# Patient Record
Sex: FEMALE | Race: BLACK OR AFRICAN AMERICAN | Employment: FULL TIME | URBAN - METROPOLITAN AREA
[De-identification: names, ages, dates, MRNs, and addresses within clinical notes are randomized per-mention and may not be internally consistent; named-entity substitution may affect disease eponyms.]

---

## 2017-04-14 ENCOUNTER — HOSPITAL ENCOUNTER (OUTPATIENT)
Dept: CT IMAGING | Age: 67
Discharge: HOME | End: 2017-04-14
Attending: INTERNAL MEDICINE
Payer: SELF-PAY

## 2017-04-14 DIAGNOSIS — R06.09 DOE (DYSPNEA ON EXERTION): ICD-10-CM

## 2017-04-14 PROCEDURE — 75571 CT HRT W/O DYE W/CA TEST: CPT

## 2017-04-19 ENCOUNTER — TELEPHONE (OUTPATIENT)
Dept: CARDIAC REHAB | Age: 67
End: 2017-04-19

## 2018-12-19 ENCOUNTER — OFFICE VISIT (OUTPATIENT)
Dept: CARDIOLOGY CLINIC | Age: 68
End: 2018-12-19

## 2018-12-19 VITALS
BODY MASS INDEX: 23.39 KG/M2 | HEART RATE: 68 BPM | DIASTOLIC BLOOD PRESSURE: 84 MMHG | HEIGHT: 64 IN | SYSTOLIC BLOOD PRESSURE: 146 MMHG | WEIGHT: 137 LBS | OXYGEN SATURATION: 98 %

## 2018-12-19 DIAGNOSIS — R07.9 CHEST PAIN, UNSPECIFIED TYPE: Primary | ICD-10-CM

## 2018-12-19 DIAGNOSIS — R42 DIZZINESS: ICD-10-CM

## 2018-12-19 DIAGNOSIS — R06.09 DOE (DYSPNEA ON EXERTION): ICD-10-CM

## 2018-12-19 RX ORDER — ASPIRIN 81 MG/1
TABLET ORAL DAILY
COMMUNITY

## 2018-12-19 RX ORDER — ATORVASTATIN CALCIUM 20 MG/1
TABLET, FILM COATED ORAL DAILY
COMMUNITY

## 2018-12-19 NOTE — PROGRESS NOTES
Chip Hines presents today for a follow up    Chip Hines preferred language for health care discussion is english/other. Is someone accompanying this pt? No    Is the patient using any DME equipment during OV? No    Depression Screening:  PHQ over the last two weeks 12/19/2018   Little interest or pleasure in doing things Not at all   Feeling down, depressed, irritable, or hopeless Not at all   Total Score PHQ 2 0       Learning Assessment:  Learning Assessment 3/9/2015   PRIMARY LEARNER Patient   PRIMARY LANGUAGE ENGLISH   LEARNER PREFERENCE PRIMARY LISTENING     DEMONSTRATION     READING   ANSWERED BY patient   RELATIONSHIP SELF         Fall Risk  Fall Risk Assessment, last 12 mths 12/19/2018   Able to walk? Yes   Fall in past 12 months? No       Pt currently taking Anticoagulant or Antiplatelet therapy? Yes    Coordination of Care:  1. Have you been to the ER, urgent care clinic since your last visit? Hospitalized since your last visit? No    2. Have you seen or consulted any other health care providers outside of the [address] [place] [address] since your last visit? Include any pap smears or colon screening. No    Has patient had recent lab work or cardiac testing?  Lab work 4 months ago    Patient verified medications verbally

## 2018-12-19 NOTE — PROGRESS NOTES
HISTORY OF PRESENT ILLNESS  Di Gutierrez is a 80 y.o. female. HPI  This is her first visit since August 2016. She is complaining of recurrent episodes of exertional chest burning and tightness in the mid substernal area which is associated with the shortness of breath. She claims that this occurs every time she walks fast or does heavy housecleaning. She denies resting chest pain, resting dyspnea, orthopnea, PND. She denies palpitation, dizziness or syncope. She denies any symptoms of TIA or amaurosis fugax. She indicates that her evaluation for hypercoagulable state has been negative and she has been off Xarelto for a few years with no further issues with DVT or pulmonary emboli. She underwent coronary calcium score on 4/19/17 which was calculated at 16 indicating low probability of coronary artery disease. She had right shoulder rotator cuff repair in early February 2015. She had nausea and vomiting related to pain medication for four days and became extremely dehydrated. She was admitted to the hospital between 2/8/15-2/16/15 with hypotensive shock. She ended up with a diagnosis of acute pulmonary embolism with right ventricular dilatation and right ventricular dysfunction from pulmonary hypertension. She had acute renal failure on admission with creatinine up to 4.0 which had become normalized at the time of discharge. She had a negative vascular study for DVT in the legs, as well as in the arm, however her CT scan demonstrated multiple pulmonary emboli. She has been treated with Xarelto ever since. She did have cardiac enzyme elevation with the troponin up to 1.12 and total CPK of 1,478 with MB of 9.3. Her echocardiogram on 2/9/15 demonstrated normal left ventricular systolic function with EF in the range of 55-60% and grade 1 diastolic dysfunction. There was severely dilated left ventricle with markedly reduced right ventricular function. There was no significant valvular pathology.  PA pressure was estimated in the range of at least 50-55 mmHg. She did have transient thrombocytopenia which has resolved. She also was found to have shock liver from the hemodynamic deterioration.    She is a nonsmoker and she has no history of diabetes mellitus. She is a non drinker. She has a history of hypertension and family history of coronary artery disease in that her father had a heart attack in his late 46s or early 62s. She indicates that her cholesterol level has been borderline high.    She had a follow up echocardiogram on 4/20/15 which demonstrated normal left ventricular systolic function with EF in the 60% range. Her right ventricle was mildly dilated, but the size has decreased significantly. PA pressure was in the range of 30-35 mmHg. There was no significant valvular pathology. In comparison from the study of 2/9/15, the overall left ventricular function has not changed. The right ventricular size has decreased and overall right ventricular function has improved. The pulmonary artery pressure has decreased to very mild elevation. She underwent stress nuclear cardiac imaging on 4/20/15, which demonstrated normal perfusion with no evidence of scarring or ischemia. The ejection fraction was in the 75% range.      Review of Systems   Constitutional: Negative for malaise/fatigue and weight loss. HENT: Negative for hearing loss. Eyes: Negative for blurred vision and double vision. Respiratory: Positive for shortness of breath. Cardiovascular: Negative for chest pain, palpitations, orthopnea, claudication, leg swelling and PND. Skin: Negative for itching and rash. Neurological: Negative for dizziness and weakness. Psychiatric/Behavioral: Negative for depression and memory loss. Physical Exam   Constitutional: She is oriented to person, place, and time. She appears well-developed and well-nourished. HENT:   Head: Normocephalic and atraumatic.    Eyes: Conjunctivae are normal. Pupils are equal, round, and reactive to light. Neck: Normal range of motion. Neck supple. No JVD present. Cardiovascular: Normal rate, regular rhythm, S1 normal and S2 normal.  No extrasystoles are present. PMI is not displaced. Exam reveals no gallop and no friction rub. No murmur heard. Pulses:       Carotid pulses are 3+ on the right side, and 3+ on the left side. Pulmonary/Chest: Effort normal. She has no rales. Abdominal: Soft. There is no tenderness. Musculoskeletal: She exhibits no edema. Neurological: She is alert and oriented to person, place, and time. No cranial nerve deficit. Skin: Skin is warm and dry. Psychiatric: She has a normal mood and affect. Her behavior is normal.     Visit Vitals  /84   Pulse 68   Ht 5' 4\" (1.626 m)   Wt 62.1 kg (137 lb)   SpO2 98%   BMI 23.52 kg/m²       Past Medical History:   Diagnosis Date    Anomalous pulmonary venous drainage     Cardiac echocardiogram 04/20/2015    Ltd study. EF 60%. Mild RVE. RVSP 30-35 mmHg. No L-R or R-L atrial septal shunt. Mild TR.  Cardiac nuclear imaging test, low risk 04/20/2015    No ischemia or prior infarction. Sm LV size. EF 75%. Neg EKG on pharm stress test.  Low risk.  Cardiovascular lower extremity venous duplex 02/09/2015    No DVT bilaterally.  Cardiovascular RUE venous duplex 02/09/2015    Right arm:  No DVT.     Hypertension     Pulmonary embolism [facility]) Feb 2015    S/p RT shoulder surgery       Social History     Socioeconomic History    Marital status:      Spouse name: Not on file    Number of children: Not on file    Years of education: Not on file    Highest education level: Not on file   Social Needs    Financial resource strain: Not on file    Food insecurity - worry: Not on file    Food insecurity - inability: Not on file    Transportation needs - medical: Not on file   [employer] needs - non-medical: Not on file   Occupational History    Not on file   Tobacco Use    Smoking status: Never Smoker    Smokeless tobacco: Never Used   Substance and Sexual Activity    Alcohol use: No    Drug use: No    Sexual activity: Not on file   Other Topics Concern    Not on file   Social History Narrative    Not on file       Family History   Problem Relation Age of Onset    Stroke Father     Heart Attack Father        Past Surgical History:   Procedure Laterality Date    HX BREAST BIOPSY      HX HYSTERECTOMY      HX SHOULDER ARTHROSCOPY         Current Outpatient Medications   Medication Sig Dispense Refill    aspirin delayed-release 81 mg tablet Take  by mouth daily.  atorvastatin (LIPITOR) 20 mg tablet Take  by mouth daily.  valsartan-hydrochlorothiazide (DIOVAN-HCT) 160-25 mg per tablet Take 1 Tab by mouth daily. 90 Tab 2    potassium chloride (K-DUR, KLOR-CON) 20 mEq tablet Take 20 mEq by mouth daily. 0    omeprazole (PRILOSEC) 40 mg capsule Take 40 mg by mouth daily.  cyanocobalamin (VITAMIN B-12) 1,000 mcg tablet Take 1,000 mcg by mouth daily.  ascorbic acid (VITAMIN C) 500 mg tablet Take 250 mg by mouth daily.  cholecalciferol (VITAMIN D3) 400 unit tab tablet Take 400 Units by mouth daily. EKG: unchanged from previous tracings, normal sinus rhythm, nonspecific ST and T waves changes    . ASSESSMENT and PLAN  Encounter Diagnoses   Name Primary?  Chest pain, unspecified type Yes    Dizziness     LOVE (dyspnea on exertion)    Despite the fact that she had low coronary calcium score in 2017 indicating low probability of coronary artery disease, her exertional chest pain sounds suggestive of exertional angina and at this point I would proceed with stress nuclear cardiac imaging for further evaluation. I would recommend stress nuclear cardiac imaging because of baseline nonspecific ST-T changes. She indicates she may not be able to walk well on treadmill either.

## 2018-12-28 ENCOUNTER — HOSPITAL ENCOUNTER (OUTPATIENT)
Dept: NON INVASIVE DIAGNOSTICS | Age: 68
Discharge: HOME | End: 2018-12-28
Attending: INTERNAL MEDICINE
Payer: COMMERCIAL

## 2018-12-28 DIAGNOSIS — R07.9 CHEST PAIN, UNSPECIFIED TYPE: ICD-10-CM

## 2018-12-28 PROCEDURE — 74011250636 HC RX REV CODE- 250/636: Performed by: INTERNAL MEDICINE

## 2018-12-28 PROCEDURE — 93017 CV STRESS TEST TRACING ONLY: CPT

## 2018-12-28 RX ORDER — SODIUM CHLORIDE 9 MG/ML
250 INJECTION, SOLUTION INTRAVENOUS ONCE
Status: DISPENSED | OUTPATIENT
Start: 2018-12-28 | End: 2018-12-28

## 2018-12-28 RX ORDER — SODIUM CHLORIDE 0.9 % (FLUSH) 0.9 %
10 SYRINGE (ML) INJECTION AS NEEDED
Status: DISCONTINUED | OUTPATIENT
Start: 2018-12-28 | End: 2019-01-01 | Stop reason: HOSPADM

## 2018-12-28 NOTE — PROGRESS NOTES
Unable to complete nuclear stress test on 12/28/18 because camera went down. Patient was told that she would be contacted to complete test when camera is back up and working.

## 2019-01-04 VITALS
SYSTOLIC BLOOD PRESSURE: 110 MMHG | HEIGHT: 64 IN | BODY MASS INDEX: 23.39 KG/M2 | WEIGHT: 137 LBS | DIASTOLIC BLOOD PRESSURE: 70 MMHG

## 2019-01-04 LAB
STRESS BASELINE DIAS BP: 70 MMHG
STRESS BASELINE HR: 63 BPM
STRESS BASELINE SYS BP: 110 MMHG
STRESS ESTIMATED WORKLOAD: 1.5 METS
STRESS EXERCISE DUR MIN: NORMAL
STRESS PEAK DIAS BP: 60 MMHG
STRESS PEAK SYS BP: 118 MMHG
STRESS PERCENT HR ACHIEVED: 86 %
STRESS POST PEAK HR: 112 BPM
STRESS RATE PRESSURE PRODUCT: NORMAL BPM*MMHG
STRESS ST DEPRESSION: 0 MM
STRESS ST ELEVATION: 0 MM
STRESS STAGE 1 BP: NORMAL MMHG
STRESS STAGE 1 DURATION: 3 MIN:SEC
STRESS STAGE 1 HR: 105 BPM
STRESS STAGE RECOVERY 1 BP: NORMAL MMHG
STRESS STAGE RECOVERY 1 DURATION: 1 MIN:SEC
STRESS STAGE RECOVERY 1 HR: 80 BPM
STRESS TARGET HR: 130 BPM

## 2019-01-04 RX ORDER — SODIUM CHLORIDE 9 MG/ML
250 INJECTION, SOLUTION INTRAVENOUS ONCE
Status: COMPLETED | OUTPATIENT
Start: 2019-01-04 | End: 2019-01-04

## 2019-01-04 RX ORDER — SODIUM CHLORIDE 0.9 % (FLUSH) 0.9 %
10 SYRINGE (ML) INJECTION AS NEEDED
Status: COMPLETED | OUTPATIENT
Start: 2019-01-04 | End: 2019-01-04

## 2019-01-04 RX ADMIN — Medication 10 ML: at 13:00

## 2019-01-04 RX ADMIN — REGADENOSON 0.4 MG: 0.08 INJECTION, SOLUTION INTRAVENOUS at 13:15

## 2019-01-04 RX ADMIN — SODIUM CHLORIDE 250 ML/HR: 900 INJECTION, SOLUTION INTRAVENOUS at 13:15

## 2019-01-04 NOTE — PROGRESS NOTES
Patient was injected with 04.2 millicuries 80JJK Sestamibi on 12/28/18 at 0800. Patient was injected with 23.6 millicuries 09IRI Sestamibi on 1/4/19 at 1315. Patient's armbands were removed and placed in shred-it box.  
 
Patient had a Nuclear Lexiscan Stress Test.

## 2019-12-23 ENCOUNTER — OFFICE VISIT (OUTPATIENT)
Dept: CARDIOLOGY CLINIC | Age: 69
End: 2019-12-23

## 2019-12-23 VITALS
OXYGEN SATURATION: 96 % | DIASTOLIC BLOOD PRESSURE: 90 MMHG | WEIGHT: 142 LBS | HEIGHT: 64 IN | HEART RATE: 66 BPM | SYSTOLIC BLOOD PRESSURE: 140 MMHG | BODY MASS INDEX: 24.24 KG/M2

## 2019-12-23 DIAGNOSIS — R07.9 CHEST PAIN, UNSPECIFIED TYPE: Primary | ICD-10-CM

## 2019-12-23 DIAGNOSIS — R06.09 DOE (DYSPNEA ON EXERTION): ICD-10-CM

## 2019-12-23 DIAGNOSIS — I10 ESSENTIAL HYPERTENSION: ICD-10-CM

## 2019-12-23 DIAGNOSIS — R42 DIZZINESS: ICD-10-CM

## 2019-12-23 NOTE — PROGRESS NOTES
Маряи Verde presents today for   Chief Complaint   Patient presents with    Chest Pain (Angina)     1 YEAR F/U - no cardiac complaints       Rody Isbell preferred language for health care discussion is english/other. Is someone accompanying this pt? no    Is the patient using any DME equipment during [address]? no    Depression Screening:  3 most recent PHQ Screens 12/19/2018   Little interest or pleasure in doing things Not at all   Feeling down, depressed, irritable, or hopeless Not at all   Total Score PHQ 2 0       Learning Assessment:  Learning Assessment 3/9/2015   PRIMARY LEARNER Patient   PRIMARY LANGUAGE ENGLISH   LEARNER PREFERENCE PRIMARY LISTENING     DEMONSTRATION     READING   ANSWERED BY patient   RELATIONSHIP SELF       Abuse Screening:  No flowsheet data found. Fall Risk  Fall Risk Assessment, last 12 mths 12/19/2018   Able to walk? Yes   Fall in past 12 months? No       Pt currently taking Anticoagulant therapy? no    Coordination of Care:  1. Have you been to the ER, urgent care clinic since your last visit? Hospitalized since your last visit? no    2. Have you seen or consulted any other health care providers outside of the [address] [place] [address] since your last visit? Include any pap smears or colon screening.  no

## 2019-12-23 NOTE — PROGRESS NOTES
HISTORY OF PRESENT ILLNESS  Johny Callaway is a 77 y.o. female. HPI  She has been doing well from a cardiac standpoint. She has had no chest pain, dyspnea, orthopnea, PND. She has had no palpitation, dizziness or syncope. She has had no symptoms to indicate TIA or amaurosis fugax. She has been off anticoagulation for a number of years. There has been no recurrent deep venous thrombosis. She underwent stress nuclear cardiac imaging on 12/28/18 which demonstrated normal perfusion with no evidence of scarring or ischemia. The EF was in the 83% range. There was some inferior fixed defect which was felt to be due to artifact. She had right shoulder rotator cuff repair in early February 2015. She had nausea and vomiting related to pain medication for four days and became extremely dehydrated. She was admitted to the hospital between 2/8/15-2/16/15 with hypotensive shock. She ended up with a diagnosis of acute pulmonary embolism with right ventricular dilatation and right ventricular dysfunction from pulmonary hypertension. She had acute renal failure on admission with creatinine up to 4.0 which had become normalized at the time of discharge. She had a negative vascular study for DVT in the legs, as well as in the arm, however her CT scan demonstrated multiple pulmonary emboli. She has been treated with Xarelto ever since. She did have cardiac enzyme elevation with the troponin up to 1.12 and total CPK of 1,478 with MB of 9.3. Her echocardiogram on 2/9/15 demonstrated normal left ventricular systolic function with EF in the range of 55-60% and grade 1 diastolic dysfunction. There was severely dilated left ventricle with markedly reduced right ventricular function. There was no significant valvular pathology. PA pressure was estimated in the range of at least 50-55 mmHg. She did have transient thrombocytopenia which has resolved.  She also was found to have shock liver from the hemodynamic deterioration.    She is a nonsmoker and she has no history of diabetes mellitus. She is a non drinker. She has a history of hypertension and family history of coronary artery disease in that her father had a heart attack in his late 46s or early 62s. She indicates that her cholesterol level has been borderline high.    She had a follow up echocardiogram on 4/20/15 which demonstrated normal left ventricular systolic function with EF in the 60% range. Her right ventricle was mildly dilated, but the size has decreased significantly. PA pressure was in the range of 30-35 mmHg. There was no significant valvular pathology. In comparison from the study of 2/9/15, the overall left ventricular function has not changed. The right ventricular size has decreased and overall right ventricular function has improved. The pulmonary artery pressure has decreased to very mild elevation. She underwent stress nuclear cardiac imaging on 4/20/15, which demonstrated normal perfusion with no evidence of scarring or ischemia. The ejection fraction was in the 75% range.     She indicates that her evaluation for hypercoagulable state has been negative and she has been off Xarelto for a few years with no further issues with DVT or pulmonary emboli. She underwent coronary calcium score on 4/19/17 which was calculated at 16 indicating low probability of coronary artery disease. Review of Systems   Constitutional: Negative for malaise/fatigue and weight loss. HENT: Negative for hearing loss. Eyes: Negative for blurred vision and double vision. Respiratory: Negative for shortness of breath. Cardiovascular: Negative for chest pain, palpitations, orthopnea, claudication, leg swelling and PND. Gastrointestinal: Negative for blood in stool, heartburn and melena. Genitourinary: Negative for dysuria, frequency, hematuria and urgency. Musculoskeletal: Negative for back pain and joint pain. Skin: Negative for itching and rash.    Neurological: Negative for dizziness and loss of consciousness. Psychiatric/Behavioral: Negative for depression and memory loss. Physical Exam   Constitutional: She is oriented to person, place, and time. She appears well-developed and well-nourished. HENT:   Head: Normocephalic and atraumatic. Eyes: Pupils are equal, round, and reactive to light. Conjunctivae are normal.   Neck: Normal range of motion. Neck supple. No JVD present. Cardiovascular: Normal rate, regular rhythm, S1 normal and S2 normal.  No extrasystoles are present. PMI is not displaced. Exam reveals no gallop and no friction rub. No murmur heard. Pulses:       Carotid pulses are 3+ on the right side and 3+ on the left side. Pulmonary/Chest: Effort normal. She has no rales. Abdominal: Soft. There is no tenderness. Musculoskeletal:         General: No edema. Neurological: She is alert and oriented to person, place, and time. No cranial nerve deficit. Skin: Skin is warm and dry. Psychiatric: She has a normal mood and affect. Her behavior is normal.     Visit Vitals  /90   Pulse 66   Ht 5' 4\" (1.626 m)   Wt 64.4 kg (142 lb)   SpO2 96%   BMI 24.37 kg/m²       Past Medical History:   Diagnosis Date    Anomalous pulmonary venous drainage     Cardiac echocardiogram 04/20/2015    Ltd study. EF 60%. Mild RVE. RVSP 30-35 mmHg. No L-R or R-L atrial septal shunt. Mild TR.  Cardiac nuclear imaging test, low risk 04/20/2015    No ischemia or prior infarction. Sm LV size. EF 75%. Neg EKG on pharm stress test.  Low risk.  Cardiovascular lower extremity venous duplex 02/09/2015    No DVT bilaterally.  Cardiovascular RUE venous duplex 02/09/2015    Right arm:  No DVT.     Hypertension     Pulmonary embolism [facility]) Feb 2015    S/p RT shoulder surgery       Social History     Socioeconomic History    Marital status:      Spouse name: Not on file    Number of children: Not on file    Years of education: Not on file    Highest education level: Not on file   Occupational History    Not on file   Social Needs    Financial resource strain: Not on file    Food insecurity:     Worry: Not on file     Inability: Not on file    Transportation needs:     Medical: Not on file     Non-medical: Not on file   Tobacco Use    Smoking status: Never Smoker    Smokeless tobacco: Never Used   Substance and Sexual Activity    Alcohol use: No    Drug use: No    Sexual activity: Not on file   Lifestyle    Physical activity:     Days per week: Not on file     Minutes per session: Not on file    Stress: Not on file   Relationships    Social connections:     Talks on phone: Not on file     Gets together: Not on file     Attends [employer] service: Not on file     Active member of club or organization: Not on file     Attends meetings of clubs or organizations: Not on file     Relationship status: Not on file    Intimate partner violence:     Fear of current or ex partner: Not on file     Emotionally abused: Not on file     Physically abused: Not on file     Forced sexual activity: Not on file   Other Topics Concern    Not on file   Social History Narrative    Not on file       Family History   Problem Relation Age of Onset    Stroke Father     Heart Attack Father        Past Surgical History:   Procedure Laterality Date    HX BREAST BIOPSY      HX HYSTERECTOMY      HX SHOULDER ARTHROSCOPY         Current Outpatient Medications   Medication Sig Dispense Refill    aspirin delayed-release 81 mg tablet Take  by mouth daily.  atorvastatin (LIPITOR) 20 mg tablet Take  by mouth daily.  valsartan-hydrochlorothiazide (DIOVAN-HCT) 160-25 mg per tablet Take 1 Tab by mouth daily. 90 Tab 2    omeprazole (PRILOSEC) 40 mg capsule Take 40 mg by mouth daily.  cyanocobalamin (VITAMIN B-12) 1,000 mcg tablet Take 1,000 mcg by mouth daily.  ascorbic acid (VITAMIN C) 500 mg tablet Take 250 mg by mouth daily.       cholecalciferol (VITAMIN D3) 400 unit tab tablet Take 400 Units by mouth daily.  potassium chloride (K-DUR, KLOR-CON) 20 mEq tablet Take 20 mEq by mouth daily. 0       EKG: normal EKG, normal sinus rhythm, unchanged from previous tracings, nonspecific ST and T waves changes. ASSESSMENT and PLAN  Encounter Diagnoses   Name Primary?  Chest pain, unspecified type Yes    Dizziness     LOVE (dyspnea on exertion)     Essential hypertension    She has been doing well from a cardiac standpoint. She is no longer experiencing chest pain. Her chest pain was noncardiac in nature and she had a negative stress nuclear cardiac imaging in December 2018 and had low coronary calcium score at 16 in April 2017. The patient was reassured. For now, continue on current medical regimen.

## 2020-12-15 ENCOUNTER — OFFICE VISIT (OUTPATIENT)
Dept: CARDIOLOGY CLINIC | Age: 70
End: 2020-12-15
Payer: COMMERCIAL

## 2020-12-15 VITALS
BODY MASS INDEX: 23.9 KG/M2 | HEIGHT: 64 IN | OXYGEN SATURATION: 98 % | DIASTOLIC BLOOD PRESSURE: 85 MMHG | SYSTOLIC BLOOD PRESSURE: 165 MMHG | HEART RATE: 60 BPM | WEIGHT: 140 LBS

## 2020-12-15 DIAGNOSIS — R07.9 CHEST PAIN, UNSPECIFIED TYPE: Primary | ICD-10-CM

## 2020-12-15 DIAGNOSIS — I10 ESSENTIAL HYPERTENSION: ICD-10-CM

## 2020-12-15 PROCEDURE — 93000 ELECTROCARDIOGRAM COMPLETE: CPT | Performed by: INTERNAL MEDICINE

## 2020-12-15 PROCEDURE — 99214 OFFICE O/P EST MOD 30 MIN: CPT | Performed by: INTERNAL MEDICINE

## 2020-12-15 NOTE — PROGRESS NOTES
Courtney Polk presents today for   Chief Complaint   Patient presents with    Follow-up     1 year transfer from DR. Ade Polk preferred language for health care discussion is english/other. Is someone accompanying this pt? no    Is the patient using any DME equipment during [address]? no    Depression Screening:  3 most recent PHQ Screens 12/15/2020   Little interest or pleasure in doing things Not at all   Feeling down, depressed, irritable, or hopeless Not at all   Total Score PHQ 2 0       Learning Assessment:  Learning Assessment 3/9/2015   PRIMARY LEARNER Patient   PRIMARY LANGUAGE ENGLISH   LEARNER PREFERENCE PRIMARY LISTENING     DEMONSTRATION     READING   ANSWERED BY patient   RELATIONSHIP SELF       Abuse Screening:  Abuse Screening Questionnaire 12/15/2020   Do you ever feel afraid of your partner? N   Are you in a relationship with someone who physically or mentally threatens you? N   Is it safe for you to go home? Y       Fall Risk  Fall Risk Assessment, last 12 mths 12/15/2020   Able to walk? Yes   Fall in past 12 months? No       Pt currently taking Anticoagulant therapy? no    Coordination of Care:  1. Have you been to the ER, urgent care clinic since your last visit? Hospitalized since your last visit? no    2. Have you seen or consulted any other health care providers outside of the [address] [place] [address] since your last visit? Include any pap smears or colon screening.  no

## 2020-12-15 NOTE — PATIENT INSTRUCTIONS
Monitor Blood pressure in 3-4times a week if its higher then 140 five times contact primary care doctor Follow up Dr Debo Shah 1 year

## 2020-12-15 NOTE — PROGRESS NOTES
HPI: I saw Annika Haywood in my office today in cardiovascular evaluation regarding her past problems with chest pain and dizziness. Ms. Latonya Haywood is a very pleasant 71-year-old -American female who appears to be much younger than her stated age who has past history of noncardiac chest pain, essential systemic hypertension, hypercholesterolemia, pulmonary emboli after a right shoulder rotator cuff repair in February 2015 and a family history of coronary artery disease and that her father had a myocardial infarction in his late 46s or early 62s. She was on Xarelto for a year and a half after her pulmonary embolism, but presently is off of Xarelto on no blood thinners. It should be noted that she underwent coronary calcium scoring back on April 19, 2017 and was calculated to have a score of 16 suggesting a low probability of significant coronary artery disease. She did have a nuclear myocardial perfusion study completed on December 28, 2018 demonstrating a small to moderate basal inferior and inferolateral predominantly fixed defect most likely related to artifact with completely normal left ventricular systolic function with a calculated ejection fraction of greater than 80% and this was considered a low risk study. She comes in today and relates that she is really doing quite well. She does have some joint and right arm discomfort related to her rotator cuff surgery but denies any chest pain, shortness of breath or any other cardiovascular complaints at this time. Encounter Diagnoses   Name Primary?  Chest pain, unspecified type Yes    Essential hypertension         Discussion: This lady is not having any further chest pain issues and she would appear to be at very low risk for the development of symptomatic obstructive coronary artery disease in view of her very low coronary calcium score.     I do not have a copy of her most recent lipid profile which I would like to get from her primary care provider. She is on Lipitor 20 mg daily for management of this problem and I would leave management of this to her primary care provider. The patient's blood pressure is significantly elevated today and it was checked 3 separate times and we got 170/100, 180/88 and I myself got 165/85. I have asked the patient to check her blood pressure 3 or 4 times a week for the next few weeks and if her blood pressure is staying primarily above 502 systolic she should follow-up with her primary care provider for adjustment of her blood pressure medications moving forward. Since she is otherwise doing well without any cardiovascular issues I would simply have her follow-up on an as needed or annual basis with my associate Dr. Diana Lazo on since I will be retiring at the end of this year. PCP: Carley Berry MD      Past Medical History:   Diagnosis Date    Anomalous pulmonary venous drainage     Cardiac echocardiogram 04/20/2015    Ltd study. EF 60%. Mild RVE. RVSP 30-35 mmHg. No L-R or R-L atrial septal shunt. Mild TR.  Cardiac nuclear imaging test, low risk 04/20/2015    No ischemia or prior infarction. Sm LV size. EF 75%. Neg EKG on pharm stress test.  Low risk.  Cardiovascular lower extremity venous duplex 02/09/2015    No DVT bilaterally.  Cardiovascular RUE venous duplex 02/09/2015    Right arm:  No DVT.  Hypertension     Pulmonary embolism [facility]) Feb 2015    S/p RT shoulder surgery       Past Surgical History:   Procedure Laterality Date    HX BREAST BIOPSY      HX HYSTERECTOMY      HX SHOULDER ARTHROSCOPY         Current Outpatient Medications   Medication Sig    aspirin delayed-release 81 mg tablet Take  by mouth daily.  atorvastatin (LIPITOR) 20 mg tablet Take  by mouth daily.  valsartan-hydrochlorothiazide (DIOVAN-HCT) 160-25 mg per tablet Take 1 Tab by mouth daily.  potassium chloride (K-DUR, KLOR-CON) 20 mEq tablet Take 20 mEq by mouth daily.     omeprazole (PRILOSEC) 40 mg capsule Take 40 mg by mouth daily.  cyanocobalamin (VITAMIN B-12) 1,000 mcg tablet Take 1,000 mcg by mouth daily.  ascorbic acid (VITAMIN C) 500 mg tablet Take 250 mg by mouth daily.  cholecalciferol (VITAMIN D3) 400 unit tab tablet Take 400 Units by mouth daily. No current facility-administered medications for this visit. Allergies   Allergen Reactions    Oxycodone-Acetaminophen Unknown (comments)     Kept me up all night    Tramadol Nausea Only     NAUSEA       Social History :  Social History     Tobacco Use    Smoking status: Never Smoker    Smokeless tobacco: Never Used   Substance Use Topics    Alcohol use: No        Family History: family history includes Heart Attack in her father; Stroke in her father. Review of Systems: See Cardiovascular review above under HPI  General: No weight change or constitutional symptoms. HEENT: No history of glaucoma or thyroid condition,  Respiratory: No chronic cough, sputum production, hemoptysis or wheezing. Gastrointestinal: No anorexia, dysphagia, nausea, vomiting, melana, or hematochezia. Genitourinary: No UTI symptoms, hematuria, or kidney stones. Neuro/Psychiatric: No TIA's, seizures, anxiety or memory loss. Hematologic: No bleeding or clotting problems and no anemia. Skin: No rashes. Musculoskeletal: Positive for joint pain and some right arm discomfort related to her rotator cuff surgery. Physical Exam:    The patient is a cooperative, alert, well developed, well nourished 72 y. o.  female who is in no acute distress at the time of the examination. Visit Vitals  BP (!) 165/85 (BP 1 Location: Right arm, BP Patient Position: Sitting)   Pulse 60   Ht 5' 4\" (1.626 m)   Wt 63.5 kg (140 lb)   SpO2 98%   BMI 24.03 kg/m²       HEENT: Conjuctiva white, mucosa moist, no pallor or cyanosis. NECK: Supple without masses, tenderness or thyromegaly. There was no jugular venous distention.  Carotid are full bilaterally without bruits. CHEST: Symmetrical with good excursion. LUNGS: Clear to auscultation in all fields. HEART: The apex is not displaced. There were no lifts, thrills or heaves. There is a normal S1 and S2 without appreciable murmurs, rubs, clicks, or gallops auscultated. ABDOMEN: Soft without masses, tenderness or organomegaly. EXTREMITIES: Full peripheral pulses without peripheral edema. INTEGUMENT: Warm and dry   NEUROLOGICAL: The patient is oriented x 3 with motor function grossly intact. Review of Data: See PMH and Cardiology and Imaging sections for cardiac testing  No results found for: CHOL, CHOLX, CHLST, CHOLV, HDL, HDLP, LDL, LDLC, DLDLP, TGLX, TRIGL, TRIGP, CHHD, CHHDX    Results for orders placed or performed in visit on 12/15/20   AMB POC EKG ROUTINE W/ 12 LEADS, INTER & REP     Status: None    Narrative    Normal sinus rhythm, rate 60. This EKG is within normal limits and similar to the EKG of December 3, 2019. Latoya Conley D.O., F.A.C.C. [facility]  [facility]  [address] [place] [address]. [address] [facility] [place] [facility] [place]  [phone]      PLEASE NOTE:  This document has been produced using voice recognition software. Unrecognized errors in transcription may be present.

## 2021-12-15 ENCOUNTER — OFFICE VISIT (OUTPATIENT)
Dept: CARDIOLOGY CLINIC | Age: 71
End: 2021-12-15
Payer: COMMERCIAL

## 2021-12-15 VITALS
HEIGHT: 64 IN | HEART RATE: 59 BPM | BODY MASS INDEX: 23.73 KG/M2 | WEIGHT: 139 LBS | OXYGEN SATURATION: 96 % | SYSTOLIC BLOOD PRESSURE: 146 MMHG | DIASTOLIC BLOOD PRESSURE: 86 MMHG

## 2021-12-15 DIAGNOSIS — R07.9 CHEST PAIN, UNSPECIFIED TYPE: Primary | ICD-10-CM

## 2021-12-15 DIAGNOSIS — I10 ESSENTIAL HYPERTENSION: ICD-10-CM

## 2021-12-15 DIAGNOSIS — R42 DIZZINESS: ICD-10-CM

## 2021-12-15 PROCEDURE — 93000 ELECTROCARDIOGRAM COMPLETE: CPT | Performed by: INTERNAL MEDICINE

## 2021-12-15 PROCEDURE — 99214 OFFICE O/P EST MOD 30 MIN: CPT | Performed by: INTERNAL MEDICINE

## 2021-12-15 RX ORDER — GLUCOSAMINE SULFATE 1500 MG
3000 POWDER IN PACKET (EA) ORAL DAILY
COMMUNITY

## 2021-12-15 NOTE — PROGRESS NOTES
Kobe Enriquez presents today for   Chief Complaint   Patient presents with    Follow-up     Eulalio Pappas Pt., 1 year follow up       Kobe Enriquez preferred language for health care discussion is english/other. Is someone accompanying this pt? no    Is the patient using any DME equipment during [address]? no    Depression Screening:  3 most recent PHQ Screens 12/15/2020   Little interest or pleasure in doing things Not at all   Feeling down, depressed, irritable, or hopeless Not at all   Total Score PHQ 2 0       Learning Assessment:  Learning Assessment 3/9/2015   PRIMARY LEARNER Patient   PRIMARY LANGUAGE ENGLISH   LEARNER PREFERENCE PRIMARY LISTENING     DEMONSTRATION     READING   ANSWERED BY patient   RELATIONSHIP SELF       Abuse Screening:  Abuse Screening Questionnaire 12/15/2020   Do you ever feel afraid of your partner? N   Are you in a relationship with someone who physically or mentally threatens you? N   Is it safe for you to go home? Y       Fall Risk  Fall Risk Assessment, last 12 mths 12/15/2020   Able to walk? Yes   Fall in past 12 months? No           Pt currently taking Anticoagulant therapy? no    Pt currently taking Antiplatelet therapy ? ASA 81 mg once a day      Coordination of Care:  1. Have you been to the ER, urgent care clinic since your last visit? Hospitalized since your last visit? no    2. Have you seen or consulted any other health care providers outside of the [address] [place] [address] since your last visit? Include any pap smears or colon screening.  no

## 2021-12-15 NOTE — PROGRESS NOTES
Sheryl Aceves    Chief Complaint   Patient presents with    Follow-up     Prev Mian Pt., 1 year follow up       HPI    Sheryl Aceves is a 80 y.o. extremely pleasant -American female with no known history of coronary disease here for routine follow-up    She is followed with our practice for many years first with Dr. Saima Hidalgo, then Dr. Lorelei Mari. She has HTN, +FH, HL, but with low CAC, but had a PE postsurgery. She has no complaints, very active, was caring for her MIL until she passed. Still works at [employer]. Past Medical History:   Diagnosis Date    Anomalous pulmonary venous drainage     Cardiac echocardiogram 04/20/2015    Ltd study. EF 60%. Mild RVE. RVSP 30-35 mmHg. No L-R or R-L atrial septal shunt. Mild TR.  Cardiac nuclear imaging test, low risk 04/20/2015    No ischemia or prior infarction. Sm LV size. EF 75%. Neg EKG on pharm stress test.  Low risk.  Cardiovascular lower extremity venous duplex 02/09/2015    No DVT bilaterally.  Cardiovascular RUE venous duplex 02/09/2015    Right arm:  No DVT.  Hypertension     Pulmonary embolism [facility]) Feb 2015    S/p RT shoulder surgery       Past Surgical History:   Procedure Laterality Date    HX BREAST BIOPSY      HX HYSTERECTOMY      HX SHOULDER ARTHROSCOPY         Current Outpatient Medications   Medication Sig Dispense Refill    cholecalciferol (VITAMIN D3) 25 mcg (1,000 unit) cap Take 3,000 Units by mouth daily.  aspirin delayed-release 81 mg tablet Take  by mouth daily.  atorvastatin (LIPITOR) 20 mg tablet Take  by mouth daily.  valsartan-hydrochlorothiazide (DIOVAN-HCT) 160-25 mg per tablet Take 1 Tab by mouth daily. 90 Tab 2    omeprazole (PRILOSEC) 40 mg capsule Take 40 mg by mouth daily.  cyanocobalamin (VITAMIN B-12) 1,000 mcg tablet Take 1,000 mcg by mouth daily.  ascorbic acid (VITAMIN C) 500 mg tablet Take 500 mg by mouth daily.          Allergies   Allergen Reactions    Oxycodone-Acetaminophen Unknown (comments)     Kept me up all night    Tramadol Nausea Only     NAUSEA       Social History     Socioeconomic History    Marital status:      Spouse name: Not on file    Number of children: Not on file    Years of education: Not on file    Highest education level: Not on file   Occupational History    Not on file   Tobacco Use    Smoking status: Never Smoker    Smokeless tobacco: Never Used   Substance and Sexual Activity    Alcohol use: No    Drug use: No    Sexual activity: Not on file   Other Topics Concern    Not on file   Social History Narrative    Not on file     Social Determinants of Health     Financial Resource Strain:     Difficulty of Paying Living Expenses: Not on file   Food Insecurity:     Worried About Running Out of Food in the Last Year: Not on file    Soha of Food in the Last Year: Not on file   Transportation Needs:     Lack of Transportation (Medical): Not on file    Lack of Transportation (Non-Medical):  Not on file   Physical Activity:     Days of Exercise per Week: Not on file    Minutes of Exercise per Session: Not on file   Stress:     Feeling of Stress : Not on file   Social Connections:     Frequency of Communication with Friends and Family: Not on file    Frequency of Social Gatherings with Friends and Family: Not on file    Attends [employer] Services: Not on file    Active Member of [address] [place] [address] [employer] or Organizations: Not on file    Attends Club or Organization Meetings: Not on file    Marital Status: Not on file   Intimate Partner Violence:     Fear of Current or Ex-Partner: Not on file    Emotionally Abused: Not on file    Physically Abused: Not on file    Sexually Abused: Not on file   Housing Stability:     Unable to Pay for Housing in the Last Year: Not on file    Number of [place] in the Last Year: Not on file    Unstable Housing in the Last Year: Not on file        FH +    Review of Systems    14 pt Review of Systems is negative unless otherwise mentioned in the HPI. Wt Readings from Last 3 Encounters:   12/15/21 63 kg (139 lb)   12/15/20 63.5 kg (140 lb)   12/23/19 64.4 kg (142 lb)     Temp Readings from Last 3 Encounters:   09/30/16 97.5 °F (36.4 °C) (Oral)   04/07/16 97.9 °F (36.6 °C)   03/08/16 97.7 °F (36.5 °C)     BP Readings from Last 3 Encounters:   12/15/21 (!) 146/86   12/15/20 (!) 165/85   12/23/19 140/90     Pulse Readings from Last 3 Encounters:   12/15/21 (!) 59   12/15/20 60   12/23/19 66           Physical Exam:    Visit Vitals  BP (!) 146/86 (BP 1 Location: Left upper arm, BP Patient Position: Sitting, BP Cuff Size: Small adult)   Pulse (!) 59   Ht 5' 4\" (1.626 m)   Wt 63 kg (139 lb)   SpO2 96%   BMI 23.86 kg/m²      Physical Exam  HENT:      Head: Normocephalic and atraumatic. Eyes:      Pupils: Pupils are equal, round, and reactive to light. Cardiovascular:      Rate and Rhythm: Normal rate and regular rhythm. Heart sounds: Normal heart sounds. No murmur heard. No friction rub. No gallop. Pulmonary:      Effort: Pulmonary effort is normal. No respiratory distress. Breath sounds: Normal breath sounds. No wheezing or rales. Chest:      Chest wall: No tenderness. Abdominal:      General: Bowel sounds are normal.      Palpations: Abdomen is soft. Musculoskeletal:         General: No tenderness. Skin:     General: Skin is warm and dry. Neurological:      Mental Status: She is alert and oriented to person, place, and time. EKG today shows: NSR, normal axis and intervals, no ST segment abnormalities    Impression and Plan:  Delfino Dewey is a 80 y.o. with:    HTN  HL  +FH  Low CAC  H/o postsx PE    Doing well  RTC yearly    Thank you for allowing me to participate in the care of your patient, please do not hesitate to call with questions or concerns. Follow-up and Dispositions    · Return in about 1 year (around 12/15/2022).      [address] [facility],    [facility],

## 2022-07-22 ENCOUNTER — OFFICE VISIT (OUTPATIENT)
Dept: ORTHOPEDIC SURGERY | Age: 72
End: 2022-07-22
Payer: COMMERCIAL

## 2022-07-22 VITALS — TEMPERATURE: 98.4 F | BODY MASS INDEX: 23.56 KG/M2 | WEIGHT: 138 LBS | HEIGHT: 64 IN

## 2022-07-22 DIAGNOSIS — M18.0 OSTEOARTHRITIS OF CARPOMETACARPAL (CMC) JOINT OF BOTH THUMBS: Primary | ICD-10-CM

## 2022-07-22 PROCEDURE — 99203 OFFICE O/P NEW LOW 30 MIN: CPT | Performed by: ORTHOPAEDIC SURGERY

## 2022-07-22 PROCEDURE — 1123F ACP DISCUSS/DSCN MKR DOCD: CPT | Performed by: ORTHOPAEDIC SURGERY

## 2022-07-22 RX ORDER — VALSARTAN 40 MG/1
40 TABLET ORAL DAILY
COMMUNITY
End: 2022-07-22

## 2022-07-22 RX ORDER — B-COMPLEX WITH VITAMIN C
1 TABLET ORAL DAILY
COMMUNITY

## 2022-07-22 RX ORDER — HYOSCYAMINE SULFATE 0.125 MG
TABLET ORAL
COMMUNITY

## 2022-07-22 NOTE — PROGRESS NOTES
Andressa Valerio is a 71 y.o. female right handed retiree. Worker's Compensation and legal considerations: none filed. Vitals:    07/22/22 0943   Temp: 98.4 °F (36.9 °C)   TempSrc: Temporal   Weight: 138 lb (62.6 kg)   Height: 5' 4\" (1.626 m)   PainSc:  10 - Worst pain ever   PainLoc: Hand           Chief Complaint   Patient presents with    Hand Pain     Bilateral, R>L         HPI: Patient presents today with complaints of bilateral thumb base pain right worse than left. She reports previously receiving injections in beginning of June that only helped for a week or 2. She was not given any braces at this visit by another provider. Date of onset: Chronic    Injury: No    Prior Treatment:  Yes: Comment: Bilateral thumb base injections by another provider    Numbness/ Tingling: No      ROS: Review of Systems - General ROS: negative  Psychological ROS: negative  ENT ROS: negative  Allergy and Immunology ROS: negative  Hematological and Lymphatic ROS: negative  Respiratory ROS: no cough, shortness of breath, or wheezing  Cardiovascular ROS: no chest pain or dyspnea on exertion  Gastrointestinal ROS: no abdominal pain, change in bowel habits, or black or bloody stools  Musculoskeletal ROS: negative  Neurological ROS: negative  Dermatological ROS: negative    Past Medical History:   Diagnosis Date    Anomalous pulmonary venous drainage     Cardiac echocardiogram 04/20/2015    Ltd study. EF 60%. Mild RVE. RVSP 30-35 mmHg. No L-R or R-L atrial septal shunt. Mild TR.  Cardiac nuclear imaging test, low risk 04/20/2015    No ischemia or prior infarction. Sm LV size. EF 75%. Neg EKG on pharm stress test.  Low risk.  Cardiovascular lower extremity venous duplex 02/09/2015    No DVT bilaterally.  Cardiovascular RUE venous duplex 02/09/2015    Right arm:  No DVT.     Hypertension     Pulmonary embolism [facility]) Feb 2015    S/p RT shoulder surgery       Past Surgical History:   Procedure Laterality Date    HX BREAST BIOPSY      HX HYSTERECTOMY      HX SHOULDER ARTHROSCOPY         Current Outpatient Medications   Medication Sig Dispense Refill    b-complex with vitamin c tablet Take 1 Tablet by mouth in the morning.  hyoscyamine (ANASPAZ, LEVSIN) 0.125 mg tablet 1 TABLET 3 TIMES PER DAY, 20-30 MINUTES PRIOR TO MEALS ORALLY 30      cholecalciferol (VITAMIN D3) 25 mcg (1,000 unit) cap Take 3,000 Units by mouth daily.  aspirin delayed-release 81 mg tablet Take  by mouth daily.  atorvastatin (LIPITOR) 20 mg tablet Take  by mouth daily.  valsartan-hydrochlorothiazide (DIOVAN-HCT) 160-25 mg per tablet Take 1 Tab by mouth daily. 90 Tab 2    omeprazole (PRILOSEC) 40 mg capsule Take 40 mg by mouth daily.  cyanocobalamin 1,000 mcg tablet Take 1,000 mcg by mouth daily.  ascorbic acid, vitamin C, (VITAMIN C) 500 mg tablet Take 500 mg by mouth daily. Allergies   Allergen Reactions    Oxycodone-Acetaminophen Unknown (comments)     Kept me up all night    Tramadol Nausea Only     NAUSEA           PE:     Physical Exam  Vitals and nursing note reviewed. Constitutional:       General: She is not in acute distress. Appearance: Normal appearance. She is not ill-appearing. Cardiovascular:      Pulses: Normal pulses. Pulmonary:      Effort: Pulmonary effort is normal. No respiratory distress. Musculoskeletal:         General: Tenderness present. No swelling, deformity or signs of injury. Normal range of motion. Cervical back: Normal range of motion and neck supple. Right lower leg: No edema. Left lower leg: No edema. Skin:     General: Skin is warm and dry. Capillary Refill: Capillary refill takes less than 2 seconds. Neurological:      General: No focal deficit present. Mental Status: She is alert and oriented to person, place, and time.    Psychiatric:         Mood and Affect: Mood normal.         Behavior: Behavior normal.          Hand:     Examination L Digit(s) R Digit(s)   1st CMC Tenderness +  +    1st CMC Grind +  +    Faye Nodes -  -    Heberden Nodes -  -    A1 Pulley Tenderness -  -    Triggering -  -    UCL Instability -  -    RCL Instability -  -    Lateral Stress Pain -  -    Palmar Cords -  -    Tabletop test -  -    Garrod's Pads -  -     Strength       Pinch Strength         ROM: Full        Imaging:     Deferred until next visit. ICD-10-CM ICD-9-CM    1. Osteoarthritis of carpometacarpal (CMC) joint of both thumbs  M18.0 715.34 AMB SUPPLY ORDER            Plan:     Bilateral CMC braces supplied today. Patient received injections about 6 weeks ago so we have to wait for any reattempted injections in the future. Follow-up and Dispositions    Return in about 27 days (around 8/18/2022) for X-rays on arrival, and likely bilateral CMC injections.           Plan was reviewed with patient, who verbalized agreement and understanding of the plan

## 2022-09-09 ENCOUNTER — OFFICE VISIT (OUTPATIENT)
Dept: ORTHOPEDIC SURGERY | Age: 72
End: 2022-09-09
Payer: COMMERCIAL

## 2022-09-09 VITALS
WEIGHT: 143 LBS | HEART RATE: 96 BPM | BODY MASS INDEX: 24.41 KG/M2 | TEMPERATURE: 96.3 F | HEIGHT: 64 IN | OXYGEN SATURATION: 100 %

## 2022-09-09 DIAGNOSIS — M18.0 OSTEOARTHRITIS OF CARPOMETACARPAL (CMC) JOINT OF BOTH THUMBS: Primary | ICD-10-CM

## 2022-09-09 PROCEDURE — 20600 DRAIN/INJ JOINT/BURSA W/O US: CPT | Performed by: ORTHOPAEDIC SURGERY

## 2022-09-09 PROCEDURE — 73110 X-RAY EXAM OF WRIST: CPT | Performed by: ORTHOPAEDIC SURGERY

## 2022-09-09 NOTE — PROGRESS NOTES
Una Lanier is a 71 y.o. female right handed retiree. Worker's Compensation and legal considerations: none filed. Vitals:    09/09/22 1406   Pulse: 96   Temp: (!) 96.3 °F (35.7 °C)   TempSrc: Temporal   SpO2: 100%   Weight: 143 lb (64.9 kg)   Height: 5' 4\" (1.626 m)   PainSc:  10 - Worst pain ever   PainLoc: Finger           Chief Complaint   Patient presents with    Wrist Pain     Bilateral thumb CMC Osteoarthritis       HPI: Patient presents today requesting injections for bilateral thumb bases. Initial HPI: Patient presents today with complaints of bilateral thumb base pain right worse than left. She reports previously receiving injections in beginning of June that only helped for a week or 2. She was not given any braces at this visit by another provider. Date of onset: Chronic    Injury: No    Prior Treatment:  Yes: Comment: Bilateral thumb base injections by another provider    Numbness/ Tingling: No      ROS: Review of Systems - General ROS: negative  Psychological ROS: negative  ENT ROS: negative  Allergy and Immunology ROS: negative  Hematological and Lymphatic ROS: negative  Respiratory ROS: no cough, shortness of breath, or wheezing  Cardiovascular ROS: no chest pain or dyspnea on exertion  Gastrointestinal ROS: no abdominal pain, change in bowel habits, or black or bloody stools  Musculoskeletal ROS: negative  Neurological ROS: negative  Dermatological ROS: negative    Past Medical History:   Diagnosis Date    Anomalous pulmonary venous drainage     Cardiac echocardiogram 04/20/2015    Ltd study. EF 60%. Mild RVE. RVSP 30-35 mmHg. No L-R or R-L atrial septal shunt. Mild TR. Cardiac nuclear imaging test, low risk 04/20/2015    No ischemia or prior infarction. Sm LV size. EF 75%. Neg EKG on pharm stress test.  Low risk. Cardiovascular lower extremity venous duplex 02/09/2015    No DVT bilaterally. Cardiovascular RUE venous duplex 02/09/2015    Right arm:  No DVT. Hypertension     Pulmonary embolism [facility]) Feb 2015    S/p RT shoulder surgery       Past Surgical History:   Procedure Laterality Date    HX BREAST BIOPSY      HX HYSTERECTOMY      HX SHOULDER ARTHROSCOPY         Current Outpatient Medications   Medication Sig Dispense Refill    b-complex with vitamin c tablet Take 1 Tablet by mouth in the morning. hyoscyamine (ANASPAZ, LEVSIN) 0.125 mg tablet 1 TABLET 3 TIMES PER DAY, 20-30 MINUTES PRIOR TO MEALS ORALLY 30      cholecalciferol (VITAMIN D3) 25 mcg (1,000 unit) cap Take 3,000 Units by mouth daily. aspirin delayed-release 81 mg tablet Take  by mouth daily. atorvastatin (LIPITOR) 20 mg tablet Take  by mouth daily. valsartan-hydrochlorothiazide (DIOVAN-HCT) 160-25 mg per tablet Take 1 Tab by mouth daily. 90 Tab 2    omeprazole (PRILOSEC) 40 mg capsule Take 40 mg by mouth daily. cyanocobalamin 1,000 mcg tablet Take 1,000 mcg by mouth daily. ascorbic acid, vitamin C, (VITAMIN C) 500 mg tablet Take 500 mg by mouth daily. Current Facility-Administered Medications   Medication Dose Route Frequency Provider Last Rate Last Admin    triamcinolone acetonide (KENALOG) 10 mg/mL injection 10 mg  10 mg Other ONCE Mingo Antonio, DO           Allergies   Allergen Reactions    Oxycodone-Acetaminophen Unknown (comments)     Kept me up all night    Tramadol Nausea Only     NAUSEA           PE:     Physical Exam  Vitals and nursing note reviewed. Constitutional:       General: She is not in acute distress. Appearance: Normal appearance. She is not ill-appearing. Cardiovascular:      Pulses: Normal pulses. Pulmonary:      Effort: Pulmonary effort is normal. No respiratory distress. Musculoskeletal:         General: Tenderness present. No swelling, deformity or signs of injury. Normal range of motion. Cervical back: Normal range of motion and neck supple. Right lower leg: No edema. Left lower leg: No edema.    Skin: General: Skin is warm and dry. Capillary Refill: Capillary refill takes less than 2 seconds. Neurological:      General: No focal deficit present. Mental Status: She is alert and oriented to person, place, and time. Psychiatric:         Mood and Affect: Mood normal.         Behavior: Behavior normal.          Hand:     Examination L Digit(s) R Digit(s)   1st CMC Tenderness +  +    1st CMC Grind +  +    Faye Nodes -  -    Heberden Nodes -  -    A1 Pulley Tenderness -  -    Triggering -  -    UCL Instability -  -    RCL Instability -  -    Lateral Stress Pain -  -    Palmar Cords -  -    Tabletop test -  -    Garrod's Pads -  -     Strength       Pinch Strength         ROM: Full        Imaging:     Deferred until next visit. ICD-10-CM ICD-9-CM    1. Osteoarthritis of carpometacarpal (CMC) joint of both thumbs  M18.0 715.34 AMB POC XRAY, WRIST; COMPLETE, 3+ VIE      AMB POC XRAY, WRIST; COMPLETE, 3+ VIE      DRAIN/INJECT SMALL JOINT/BURSA      triamcinolone acetonide (KENALOG) 10 mg/mL injection 10 mg            Plan:     Bilateral thumb CMC joint injections and continue braces. Follow-up and Dispositions    Return if symptoms worsen or fail to improve.           Plan was reviewed with patient, who verbalized agreement and understanding of the plan    [facility] NOTE        Chart reviewed for the following:   IMingo DO, have reviewed the History, Physical and updated the Allergic reactions for Rody Isbell     TIME OUT performed immediately prior to start of procedure:   Mingo LYLE DO, have performed the following reviews on Rody Isbell prior to the start of the procedure:            * Patient was identified by name and date of birth   * Agreement on procedure being performed was verified  * Risks and Benefits explained to the patient  * Procedure site verified and marked as necessary  * Patient was positioned for comfort  * Consent was signed and verified     Time: 14:16      Date of procedure: 9/9/2022    Procedure performed by: Angel Stevenson DO    Provider assisted by: Nilay Thornton MA    Patient assisted by: self    How tolerated by patient: tolerated the procedure well with no complications    Post Procedural Pain Scale: 0 - No Hurt    Comments: none    Procedure:  After consent was obtained, using sterile technique the bilateral thumbs was prepped. Local anesthetic used: 1% lidocaine. Kenalog 5 mg X2 and was then injected and the needle withdrawn. The procedure was well tolerated. The patient is asked to continue to rest the area for a few more days before resuming regular activities. It may be more painful for the first 1-2 days. Watch for fever, or increased swelling or persistent pain in the joint. Call or return to clinic prn if such symptoms occur or there is failure to improve as anticipated.

## 2022-12-15 ENCOUNTER — OFFICE VISIT (OUTPATIENT)
Dept: CARDIOLOGY CLINIC | Age: 72
End: 2022-12-15
Payer: COMMERCIAL

## 2022-12-15 VITALS
HEART RATE: 60 BPM | WEIGHT: 144.2 LBS | OXYGEN SATURATION: 100 % | BODY MASS INDEX: 24.75 KG/M2 | DIASTOLIC BLOOD PRESSURE: 88 MMHG | SYSTOLIC BLOOD PRESSURE: 158 MMHG

## 2022-12-15 DIAGNOSIS — I10 ESSENTIAL HYPERTENSION: Primary | ICD-10-CM

## 2022-12-15 NOTE — PROGRESS NOTES
Sisi Cordoba    Chief Complaint   Patient presents with    Follow-up     yearly       HPI    Sisi Cordoba is a 71 y.o. extremely pleasant -American female with no known history of coronary disease here for routine follow-up    She is followed with our practice for many years first with Dr. Pete Quiroz, then Dr. Jordan Scott. She has HTN, +FH, HL, but with low CAC, but had a PE postsurgery. She has no complaints, very active, was caring for her MIL until she passed. Still works at [employer]. Past Medical History:   Diagnosis Date    Anomalous pulmonary venous drainage     Cardiac echocardiogram 04/20/2015    Ltd study. EF 60%. Mild RVE. RVSP 30-35 mmHg. No L-R or R-L atrial septal shunt. Mild TR. Cardiac nuclear imaging test, low risk 04/20/2015    No ischemia or prior infarction. Sm LV size. EF 75%. Neg EKG on pharm stress test.  Low risk. Cardiovascular lower extremity venous duplex 02/09/2015    No DVT bilaterally. Cardiovascular RUE venous duplex 02/09/2015    Right arm:  No DVT. Hypertension     Pulmonary embolism [facility]) Feb 2015    S/p RT shoulder surgery       Past Surgical History:   Procedure Laterality Date    HX BREAST BIOPSY      HX HYSTERECTOMY      HX SHOULDER ARTHROSCOPY         Current Outpatient Medications   Medication Sig Dispense Refill    b-complex with vitamin c tablet Take 1 Tablet by mouth in the morning. cholecalciferol (VITAMIN D3) 25 mcg (1,000 unit) cap Take 3,000 Units by mouth daily. aspirin delayed-release 81 mg tablet Take  by mouth daily. atorvastatin (LIPITOR) 20 mg tablet Take  by mouth daily. valsartan-hydrochlorothiazide (DIOVAN-HCT) 160-25 mg per tablet Take 1 Tab by mouth daily. 90 Tab 2    omeprazole (PRILOSEC) 40 mg capsule Take 40 mg by mouth daily. cyanocobalamin 1,000 mcg tablet Take 1,000 mcg by mouth daily. ascorbic acid, vitamin C, (VITAMIN C) 500 mg tablet Take 500 mg by mouth daily.       hyoscyamine (ANASPAZ, LEVSIN) 0.125 mg tablet 1 TABLET 3 TIMES PER DAY, 20-30 MINUTES PRIOR TO MEALS ORALLY 30 (Patient not taking: Reported on 12/15/2022)         Allergies   Allergen Reactions    Oxycodone-Acetaminophen Unknown (comments)     Kept me up all night    Tramadol Nausea Only     NAUSEA       Social History     Socioeconomic History    Marital status:      Spouse name: Not on file    Number of children: Not on file    Years of education: Not on file    Highest education level: Not on file   Occupational History    Not on file   Tobacco Use    Smoking status: Never    Smokeless tobacco: Never   Vaping Use    Vaping Use: Never used   Substance and Sexual Activity    Alcohol use: No    Drug use: No    Sexual activity: Not on file   Other Topics Concern    Not on file   Social History Narrative    Not on file     Social Determinants of Health     Financial Resource Strain: Not on file   Food Insecurity: Not on file   Transportation Needs: Not on file   Physical Activity: Not on file   Stress: Not on file   Social Connections: Not on file   Intimate Partner Violence: Not on file   Housing Stability: Not on file        FH +    Review of Systems    14 pt Review of Systems is negative unless otherwise mentioned in the HPI.     Wt Readings from Last 3 Encounters:   12/15/22 65.4 kg (144 lb 3.2 oz)   09/09/22 64.9 kg (143 lb)   07/22/22 62.6 kg (138 lb)     Temp Readings from Last 3 Encounters:   09/09/22 (!) 96.3 °F (35.7 °C) (Temporal)   07/22/22 98.4 °F (36.9 °C) (Temporal)   09/30/16 97.5 °F (36.4 °C) (Oral)     BP Readings from Last 3 Encounters:   12/15/22 (!) 158/88   12/15/21 (!) 146/86   12/15/20 (!) 165/85     Pulse Readings from Last 3 Encounters:   12/15/22 60   09/09/22 96   12/15/21 (!) 59           Physical Exam:    Visit Vitals  BP (!) 158/88 (BP 1 Location: Right upper arm, BP Patient Position: Sitting)   Pulse 60   Wt 65.4 kg (144 lb 3.2 oz)   SpO2 100%   BMI 24.75 kg/m²      Physical Exam  HENT:      Head: Normocephalic and atraumatic. Eyes:      Pupils: Pupils are equal, round, and reactive to light. Cardiovascular:      Rate and Rhythm: Normal rate and regular rhythm. Heart sounds: Normal heart sounds. No murmur heard. No friction rub. No gallop. Pulmonary:      Effort: Pulmonary effort is normal. No respiratory distress. Breath sounds: Normal breath sounds. No wheezing or rales. Chest:      Chest wall: No tenderness. Abdominal:      General: Bowel sounds are normal.      Palpations: Abdomen is soft. Musculoskeletal:         General: No tenderness. Skin:     General: Skin is warm and dry. Neurological:      Mental Status: She is alert and oriented to person, place, and time. EKG today shows: NSR, normal axis and intervals, no ST segment abnormalities    Impression and Plan:  Rony Arciniega is a 71 y.o. with:    HTN  HL  +FH  Low CAC  H/o postsx PE    Doing well  RTC yearly  35 mins    Thank you for allowing me to participate in the care of your patient, please do not hesitate to call with questions or concerns.       [address] [facility],    [facility], DO

## 2023-12-15 ENCOUNTER — OFFICE VISIT (OUTPATIENT)
Age: 73
End: 2023-12-15

## 2023-12-15 VITALS
SYSTOLIC BLOOD PRESSURE: 142 MMHG | OXYGEN SATURATION: 100 % | HEIGHT: 64 IN | WEIGHT: 146 LBS | BODY MASS INDEX: 24.92 KG/M2 | DIASTOLIC BLOOD PRESSURE: 84 MMHG | HEART RATE: 59 BPM

## 2023-12-15 DIAGNOSIS — I10 ESSENTIAL (PRIMARY) HYPERTENSION: Primary | ICD-10-CM

## 2023-12-15 ASSESSMENT — PATIENT HEALTH QUESTIONNAIRE - PHQ9
SUM OF ALL RESPONSES TO PHQ QUESTIONS 1-9: 0
1. LITTLE INTEREST OR PLEASURE IN DOING THINGS: 0
SUM OF ALL RESPONSES TO PHQ QUESTIONS 1-9: 0
SUM OF ALL RESPONSES TO PHQ9 QUESTIONS 1 & 2: 0
SUM OF ALL RESPONSES TO PHQ QUESTIONS 1-9: 0
2. FEELING DOWN, DEPRESSED OR HOPELESS: 0
SUM OF ALL RESPONSES TO PHQ QUESTIONS 1-9: 0

## 2023-12-15 NOTE — PROGRESS NOTES
Jennifer Sanchez presents today for   Chief Complaint   Patient presents with    Follow-up     1 year f/u with no concerns        Mark García preferred language for health care discussion is english/other. Is someone accompanying this pt? no    Is the patient using any DME equipment during OV? no    Depression Screening:  Depression: Not at risk (12/15/2023)    PHQ-2     PHQ-2 Score: 0        Learning Assessment:  Who is the primary learner? Patient    What is the preferred language for health care of the primary learner? ENGLISH    How does the primary learner prefer to learn new concepts? DEMONSTRATION    Answered By patient    Relationship to Learner SELF           Pt currently taking Anticoagulant therapy? no    Pt currently taking Antiplatelet therapy ? ASA 81 MG 1X DAILY       Coordination of Care:  1. Have you been to the ER, urgent care clinic since your last visit? Hospitalized since your last visit? no    2. Have you seen or consulted any other health care providers outside of the [address] [place] [address] since your last visit? Include any pap smears or colon screening.  no

## 2023-12-16 NOTE — PROGRESS NOTES
Lucie Jacob    Chief Complaint   Patient presents with    Follow-up     yearly       HPI    Lucie Jacob is a 71 y.o. extremely pleasant -American female with no known history of coronary disease here for routine follow-up    She is followed with our practice for many years first with Dr. Xi Brian, then Dr. Donnamarie Sicard. She has HTN, +FH, HL, but with low CAC, but had a PE postsurgery. She has no complaints, very active, was caring for her MIL until she passed. Still works at [employer]. Past Medical History:   Diagnosis Date    Anomalous pulmonary venous drainage     Cardiac echocardiogram 04/20/2015    Ltd study. EF 60%. Mild RVE. RVSP 30-35 mmHg. No L-R or R-L atrial septal shunt. Mild TR. Cardiac nuclear imaging test, low risk 04/20/2015    No ischemia or prior infarction. Sm LV size. EF 75%. Neg EKG on pharm stress test.  Low risk. Cardiovascular lower extremity venous duplex 02/09/2015    No DVT bilaterally. Cardiovascular RUE venous duplex 02/09/2015    Right arm:  No DVT. Hypertension     Pulmonary embolism [facility]) Feb 2015    S/p RT shoulder surgery       Past Surgical History:   Procedure Laterality Date    HX BREAST BIOPSY      HX HYSTERECTOMY      HX SHOULDER ARTHROSCOPY         Current Outpatient Medications   Medication Sig Dispense Refill    b-complex with vitamin c tablet Take 1 Tablet by mouth in the morning. cholecalciferol (VITAMIN D3) 25 mcg (1,000 unit) cap Take 3,000 Units by mouth daily. aspirin delayed-release 81 mg tablet Take  by mouth daily. atorvastatin (LIPITOR) 20 mg tablet Take  by mouth daily. valsartan-hydrochlorothiazide (DIOVAN-HCT) 160-25 mg per tablet Take 1 Tab by mouth daily. 90 Tab 2    omeprazole (PRILOSEC) 40 mg capsule Take 40 mg by mouth daily. cyanocobalamin 1,000 mcg tablet Take 1,000 mcg by mouth daily. ascorbic acid, vitamin C, (VITAMIN C) 500 mg tablet Take 500 mg by mouth daily.       hyoscyamine (ANASPAZ,

## 2025-04-11 ENCOUNTER — OFFICE VISIT (OUTPATIENT)
Age: 75
End: 2025-04-11

## 2025-04-11 VITALS
BODY MASS INDEX: 24.07 KG/M2 | HEART RATE: 67 BPM | WEIGHT: 141 LBS | HEIGHT: 64 IN | SYSTOLIC BLOOD PRESSURE: 148 MMHG | OXYGEN SATURATION: 98 % | DIASTOLIC BLOOD PRESSURE: 82 MMHG

## 2025-04-11 DIAGNOSIS — R07.9 CHEST PAIN ON EXERTION: ICD-10-CM

## 2025-04-11 DIAGNOSIS — I10 ESSENTIAL (PRIMARY) HYPERTENSION: Primary | ICD-10-CM

## 2025-04-11 RX ORDER — MULTIVITAMIN WITH IRON
1 TABLET ORAL DAILY
COMMUNITY

## 2025-04-11 RX ORDER — LEVOTHYROXINE SODIUM 50 UG/1
50 TABLET ORAL DAILY
COMMUNITY
Start: 2025-01-28

## 2025-04-11 ASSESSMENT — PATIENT HEALTH QUESTIONNAIRE - PHQ9
SUM OF ALL RESPONSES TO PHQ QUESTIONS 1-9: 0
2. FEELING DOWN, DEPRESSED OR HOPELESS: NOT AT ALL
1. LITTLE INTEREST OR PLEASURE IN DOING THINGS: NOT AT ALL
SUM OF ALL RESPONSES TO PHQ QUESTIONS 1-9: 0